# Patient Record
Sex: FEMALE | Race: WHITE | HISPANIC OR LATINO
[De-identification: names, ages, dates, MRNs, and addresses within clinical notes are randomized per-mention and may not be internally consistent; named-entity substitution may affect disease eponyms.]

---

## 2022-10-27 ENCOUNTER — APPOINTMENT (OUTPATIENT)
Dept: OTOLARYNGOLOGY | Facility: CLINIC | Age: 72
End: 2022-10-27

## 2022-10-27 VITALS
WEIGHT: 140 LBS | BODY MASS INDEX: 24.8 KG/M2 | HEART RATE: 75 BPM | SYSTOLIC BLOOD PRESSURE: 149 MMHG | HEIGHT: 63 IN | DIASTOLIC BLOOD PRESSURE: 77 MMHG

## 2022-10-27 DIAGNOSIS — H93.293 OTHER ABNORMAL AUDITORY PERCEPTIONS, BILATERAL: ICD-10-CM

## 2022-10-27 DIAGNOSIS — H69.83 OTHER SPECIFIED DISORDERS OF EUSTACHIAN TUBE, BILATERAL: ICD-10-CM

## 2022-10-27 DIAGNOSIS — H90.3 SENSORINEURAL HEARING LOSS, BILATERAL: ICD-10-CM

## 2022-10-27 DIAGNOSIS — H90.0 CONDUCTIVE HEARING LOSS, BILATERAL: ICD-10-CM

## 2022-10-27 PROCEDURE — 92557 COMPREHENSIVE HEARING TEST: CPT

## 2022-10-27 PROCEDURE — 92567 TYMPANOMETRY: CPT

## 2022-10-27 PROCEDURE — 99203 OFFICE O/P NEW LOW 30 MIN: CPT

## 2022-10-27 RX ORDER — CHLORTHALIDONE 25 MG/1
25 TABLET ORAL
Qty: 90 | Refills: 0 | Status: ACTIVE | COMMUNITY
Start: 2022-06-08

## 2022-10-27 RX ORDER — TOFACITINIB 11 MG/1
11 TABLET, FILM COATED, EXTENDED RELEASE ORAL
Qty: 90 | Refills: 0 | Status: ACTIVE | COMMUNITY
Start: 2022-04-29

## 2022-10-27 RX ORDER — CHROMIUM 200 MCG
TABLET ORAL
Refills: 0 | Status: ACTIVE | COMMUNITY

## 2022-10-27 RX ORDER — DENOSUMAB 60 MG/ML
INJECTION SUBCUTANEOUS
Refills: 0 | Status: ACTIVE | COMMUNITY

## 2022-10-27 RX ORDER — AMLODIPINE BESYLATE 5 MG/1
5 TABLET ORAL
Qty: 90 | Refills: 0 | Status: ACTIVE | COMMUNITY
Start: 2022-06-08

## 2022-11-01 PROBLEM — H93.293 OTHER ABNORMAL AUDITORY PERCEPTIONS, BILATERAL: Status: ACTIVE | Noted: 2022-11-01

## 2022-11-01 PROBLEM — H90.3 SENSORINEURAL HEARING LOSS (SNHL) OF BOTH EARS: Status: ACTIVE | Noted: 2022-11-01

## 2022-11-01 PROBLEM — H69.83 DYSFUNCTION OF BOTH EUSTACHIAN TUBES: Status: ACTIVE | Noted: 2022-11-01

## 2022-11-01 PROBLEM — H90.0 CONDUCTIVE HEARING LOSS OF BOTH MIDDLE EARS: Status: ACTIVE | Noted: 2022-11-01

## 2022-11-01 NOTE — DATA REVIEWED
[de-identified] : An audiogram was ordered and performed including pure tones, tympanometry and speech testing for the patients complaint of hearing loss\par I have independently reviewed the patient's audiogram from today and my findings include \par AU Normal to mild SNHL 250-8k hz with Conductive component from 250-750. AU Tymp B. Speech Recog AD 96% AS 92%

## 2022-11-01 NOTE — HISTORY OF PRESENT ILLNESS
[de-identified] : 72 year old female presents for Left sudden hearing loss 2 month ago.\par States hearing slowly started to return and currently almost back to normal \par Family history of hearing loss sister and mother  \par Denies history of otalgia, otorrhea, ear infections, tinnitus, dizziness, vertigo, ear surgeries. \par No history of head/otologic trauma, no chemo therapy, IV antibiotics or ototoxins. \par Denies loud noise exposure. \par Last audiogram never \par Patient denies cat scan or MRI

## 2022-11-01 NOTE — ASSESSMENT
[FreeTextEntry1] : 72y F with clinically suspicious for Left SSHL however hearing return without any intervention during 8/2022 episode. Patient states she feels almost completely back to baseline.\par \par Personally reviewed audiogram shows AU Normal to mild SNHL 250-8k hz with Conductive component from 250-750. AU Tymp B. Speech Recog AD 96% AS 92%\par \par Recommend:\par Left Sudden Sensorineural Hearing Loss\par -Discussed variety of disorders affecting the ear can cause SSHL, but only about 10 percent of people diagnosed with SSHL have an identifiable cause. Majority is idiopathic. \par -Explained that the only known treatment for sudden deafness, especially when the cause is unknown, is corticosteroids. Steroids treat the disorders by working to reducing inflammation, decreasing swelling within the nerve of hearing\par -Explained that sometimes despite appropriate treatment in a timely manner hearing may not return to baseline.\par -Patient not interested any PO steroid because she feels hearing is baseline\par \par Eustachian Tube Dysfunction\par - Discussed with patient that the Eustachian Tubes run between the inside of the ears and the back of the nose. They keep air pressure stable in the ears. If your eustachian tubes become blocked, the air pressure in your ears changes. Fluid or inflammation from an recent illness can clog eustachian tubes, causing pain in the ears. A quick change in air pressure can cause eustachian tubes to close up. This might happen when an airplane changes altitude or when a  goes up or down underwater.\par -Patient not interested in Flonase for Eustachian dysfunction with conductive component seen on audiogram\par \par -Return to clinic annually to monitor hearing loss or sooner if new/worsen symptoms present\par

## 2022-11-01 NOTE — REASON FOR VISIT
[Initial Evaluation] : an initial evaluation for [Other: _____] : [unfilled] [Patient Declined  Services] : - None: Patient declined  services [FreeTextEntry2] : left hearing loss.  [FreeTextEntry3] : Patient declined offer of translation service. Patient preferred to use accompanying family member (son) for translation.